# Patient Record
Sex: MALE | Race: OTHER | HISPANIC OR LATINO | URBAN - METROPOLITAN AREA
[De-identification: names, ages, dates, MRNs, and addresses within clinical notes are randomized per-mention and may not be internally consistent; named-entity substitution may affect disease eponyms.]

---

## 2024-08-11 ENCOUNTER — EMERGENCY (EMERGENCY)
Facility: HOSPITAL | Age: 54
LOS: 1 days | Discharge: HOME | End: 2024-08-11
Attending: EMERGENCY MEDICINE | Admitting: EMERGENCY MEDICINE
Payer: COMMERCIAL

## 2024-08-11 VITALS
OXYGEN SATURATION: 98 % | DIASTOLIC BLOOD PRESSURE: 81 MMHG | HEART RATE: 78 BPM | SYSTOLIC BLOOD PRESSURE: 132 MMHG | TEMPERATURE: 98 F | RESPIRATION RATE: 16 BRPM | WEIGHT: 184.97 LBS | HEIGHT: 66 IN

## 2024-08-11 PROCEDURE — 72110 X-RAY EXAM L-2 SPINE 4/>VWS: CPT

## 2024-08-11 PROCEDURE — 72110 X-RAY EXAM L-2 SPINE 4/>VWS: CPT | Mod: 26

## 2024-08-11 PROCEDURE — 99283 EMERGENCY DEPT VISIT LOW MDM: CPT | Mod: 25

## 2024-08-11 PROCEDURE — 99284 EMERGENCY DEPT VISIT MOD MDM: CPT

## 2024-08-11 RX ORDER — NAPROXEN 250 MG
500 TABLET ORAL ONCE
Refills: 0 | Status: COMPLETED | OUTPATIENT
Start: 2024-08-11 | End: 2024-08-11

## 2024-08-11 RX ORDER — CYCLOBENZAPRINE HCL 10 MG
1 TABLET ORAL
Qty: 10 | Refills: 0
Start: 2024-08-11

## 2024-08-11 RX ORDER — NAPROXEN 250 MG
1 TABLET ORAL
Qty: 10 | Refills: 0
Start: 2024-08-11

## 2024-08-11 RX ADMIN — Medication 500 MILLIGRAM(S): at 08:10

## 2024-08-11 NOTE — ED PROVIDER NOTE - OBJECTIVE STATEMENT
53-year-old male with no known significant past medical history presents with lower back pain.  Patient states 2 days ago was sitting on the floor trying to do some work on his car, when he stood up he developed spasm on the left side of his lower back.  The pain is in his lower back, nonradiating.  No acute chest pain or shortness of breath.  No anterior abdominal pain.  No fever or chills.  Increased pain with certain movements.  No numbness or tingling.  No aggravating or alleviating factors otherwise noted.  No other acute injury or complaints.  Pt with no history of metastatic disease, unexplained weight loss, fever or night sweats. Pt with no hx immunocompromise disease, HIV, prolonged steroid use, IVDA.  No history of recent infections. No history of aortic disease / aneurysms. No history of urinary retention, bowel incontinence or saddle anesthesia. No apparent history of "red flags" to account for patients low back pain.

## 2024-08-11 NOTE — ED ADULT TRIAGE NOTE - BIRTH SEX

## 2024-08-11 NOTE — ED PROVIDER NOTE - PATIENT PORTAL LINK FT
You can access the FollowMyHealth Patient Portal offered by [facility] by registering at the following website: [email or web address]. By joining [facility]’s FollowMyHealth portal, you will also be able to view your health information using other applications (apps) compatible with our system.

## 2024-08-11 NOTE — ED PROVIDER NOTE - PHYSICAL EXAMINATION
Positive tenderness to left paraspinal lumbar, with some spasm.  No midline spinal tenderness.  Neuro:   AA)x3, Cranial nerves 2-12 intact, central and peripheral vision intact, extra-ocular movements intact.  Neck: normal. No signs of tenderness or edema. Speech: clear, Gait and Weight Bearing: normal, Deep Tendon Reflexes: normal, 2+ reflexes (PT, Knee), Sensation: present and normal in 4 extremities, Coordination: normal, Pronator Drift: none, Straight Leg Raise: normal bilaterally with equal strength bl, Motor: normal. Nl strength (5/5) equal bl x 4, Posturing: normal, Normal saddle sensation.

## 2024-08-11 NOTE — ED ADULT TRIAGE NOTE - CHIEF COMPLAINT QUOTE
53y M  to ED triage from home, c/o LT lower back pain with spasms, worse with movement... on Friday, pt was working on car from the floor, then as he stood up, felt back spasm/pain. pain worsening since then

## 2024-08-11 NOTE — ED PROVIDER NOTE - CLINICAL SUMMARY MEDICAL DECISION MAKING FREE TEXT BOX
Acute low back pain on the left side, nonradiating without sciatica  Status post injury.  Will check x-ray, meds, outpatient follow-up

## 2024-08-11 NOTE — ED PROVIDER NOTE - NSFOLLOWUPINSTRUCTIONS_ED_ALL_ED_FT
1.  Follow-up with your Primary Medical Doctor or referred doctor. Call today / next business day for prompt follow-up.  2.  With back pain, whether it is a new pain or a chronic pain, it is very important to have close, prompt outpatient follow-up for continued workup, evaluation and definitive diagnosis.  If you develop worsening or persistent pain, any numbness, tingling, weakness of one or both of your legs, any fever, or any changes / difficulty urinating then you will need to see your back doctor immediately or you can return to the emergency room.  Many times your doctor will need to set you up for further imaging / testing such as an MRI.  3.  See attached instruction sheets for additional information, including information regarding signs and symptoms to look out for, reasons to seek immediate care and other important instructions.  4.  Follow-up with your Orthopedist or Neurosurgeon as soon as possible. If you do not have one then you will need to call the referred doctor as soon as possible (today / next business day) for prompt follow-up for further workup and treatment. See the referred doctor for information.  5.  Naproxen every 12 hours as needed, with food  6.  Flexeril as needed for muscle spasm, no driving or operating machinery while taking

## 2024-08-11 NOTE — ED PROVIDER NOTE - PROVIDER TOKENS
PROVIDER:[TOKEN:[[id number]]] PROVIDER:[TOKEN:[[id number]]],PROVIDER:[TOKEN:[[id number]]]

## 2024-08-11 NOTE — ED PROVIDER NOTE - PROGRESS NOTE DETAILS
Patient doing well, no acute complaints.  Patient with some improvement.  Discussed with patient about the nature of the back pain and the importance of outpatient follow-up for continued workup, evaluation and definitive diagnosis.  Discussed back pain and neurologic precautions including numbness, tingling, weakness, fever, and changes in bowel/bladder.  An opportunity to ask questions was given . Understanding of all instructions and precautions was verbalized and the patient will follow-up as outpatient as soon as possible. Patient also understands the possibility of needing additional imaging, ie MRI as outpatient. Patient will return with any changes, concerns, or any worsening / persistent symptoms.

## 2024-08-11 NOTE — ED PROVIDER NOTE - CARE PROVIDERS DIRECT ADDRESSES
,DirectAddress_Unknown ,DirectAddress_Unknown,[email or web address]

## 2024-08-11 NOTE — ED ADULT NURSE NOTE - NSFALLUNIVINTERV_ED_ALL_ED
Bed/Stretcher in lowest position, wheels locked, appropriate side rails in place/Call bell, personal items and telephone in reach/Instruct patient to call for assistance before getting out of bed/chair/stretcher/Non-slip footwear applied when patient is off stretcher/[place] to call system/Physically safe environment - no spills, clutter or unnecessary equipment/Purposeful proactive rounding/Room/bathroom lighting operational, light cord in reach

## 2024-08-11 NOTE — ED ADULT NURSE NOTE - OBJECTIVE STATEMENT
patient presents to ED with left back pain.  patient was working on his car on friday and when he stood up he felt immediate back pain.  Patient has been in constant back spasms since.

## 2024-08-11 NOTE — ED PROVIDER NOTE - CARE PROVIDER_API CALL
Law García [facility]  Orthopaedic Surgery  [address] [place] [id number]  Phone: [phone]  Fax: [phone]  Follow Up Time:    Law García moon  Orthopaedic Surgery  [address] [place] [id number]  Phone: [phone]  Fax: [phone]  Follow Up Time:     Nery Kim  Internal Medicine  [address] [place] [id number]  Phone: [phone]  Fax: [phone]  Follow Up Time: